# Patient Record
Sex: MALE | Race: WHITE
[De-identification: names, ages, dates, MRNs, and addresses within clinical notes are randomized per-mention and may not be internally consistent; named-entity substitution may affect disease eponyms.]

---

## 2017-02-13 ENCOUNTER — HOSPITAL ENCOUNTER (EMERGENCY)
Dept: HOSPITAL 74 - JER | Age: 42
Discharge: LEFT BEFORE DISCHARGE | End: 2017-02-13
Payer: COMMERCIAL

## 2017-02-13 VITALS — DIASTOLIC BLOOD PRESSURE: 69 MMHG | HEART RATE: 65 BPM | SYSTOLIC BLOOD PRESSURE: 127 MMHG

## 2017-02-13 VITALS — BODY MASS INDEX: 30.7 KG/M2

## 2017-02-13 VITALS — TEMPERATURE: 97 F

## 2017-02-13 DIAGNOSIS — R40.20: ICD-10-CM

## 2017-02-13 DIAGNOSIS — F19.90: ICD-10-CM
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
## 2017-02-13 DIAGNOSIS — T50.994A: Primary | ICD-10-CM

## 2017-02-13 LAB
ALBUMIN SERPL-MCNC: 3.9 G/DL (ref 3.4–5)
ALP SERPL-CCNC: 65 U/L (ref 45–117)
ALT SERPL-CCNC: 31 U/L (ref 12–78)
ANION GAP SERPL CALC-SCNC: 10 MMOL/L (ref 8–16)
AST SERPL-CCNC: 18 U/L (ref 15–37)
BASOPHILS # BLD: 0.6 % (ref 0–2)
BILIRUB SERPL-MCNC: 0.4 MG/DL (ref 0.2–1)
CALCIUM SERPL-MCNC: 9 MG/DL (ref 8.5–10.1)
CO2 SERPL-SCNC: 26 MMOL/L (ref 21–32)
CREAT SERPL-MCNC: 1 MG/DL (ref 0.7–1.3)
DEPRECATED RDW RBC AUTO: 14.8 % (ref 11.9–15.9)
EOSINOPHIL # BLD: 0.8 % (ref 0–4.5)
ETHANOL SERPL-MCNC: < 5 MG/DL (ref 0–5)
GLUCOSE SERPL-MCNC: 148 MG/DL (ref 74–106)
MCH RBC QN AUTO: 29 PG (ref 25.7–33.7)
MCHC RBC AUTO-ENTMCNC: 33.4 G/DL (ref 32–35.9)
MCV RBC: 86.8 FL (ref 80–96)
NEUTROPHILS # BLD: 74.7 % (ref 42.8–82.8)
PLATELET # BLD AUTO: 275 K/MM3 (ref 134–434)
PMV BLD: 8.3 FL (ref 7.5–11.1)
PROT SERPL-MCNC: 6.9 G/DL (ref 6.4–8.2)
WBC # BLD AUTO: 12.8 K/MM3 (ref 4–10)

## 2017-02-13 PROCEDURE — 3E033GC INTRODUCTION OF OTHER THERAPEUTIC SUBSTANCE INTO PERIPHERAL VEIN, PERCUTANEOUS APPROACH: ICD-10-PCS

## 2017-02-13 NOTE — PDOC
*Physical Exam





- Vital Signs


 Last Vital Signs











Temp Pulse Resp BP Pulse Ox


 


 97.0 F L  65   20   127/69   100 


 


 02/13/17 18:05  02/13/17 18:55  02/13/17 18:55  02/13/17 18:55  02/13/17 18:55














- Physical Exam


General Appearance: Yes: Nourished, Appropriately Dressed.  No: Apparent 

Distress


HEENT: positive: EOMI, NORMA, Normal ENT Inspection


Neck: positive: Supple.  negative: Tender


Respiratory/Chest: positive: Lungs Clear, Normal Breath Sounds.  negative: 

Chest Tender, Respiratory Distress


Cardiovascular: positive: Regular Rhythm, Regular Rate, Tachycardia


Integumentary: positive: Normal Color


Neurologic: positive: Fully Oriented, Alert, Normal Mood/Affect, Normal Response

, Motor Strength 5/5





ED Treatment Course





- LABORATORY


CBC & Chemistry Diagram: 


 02/13/17 19:00





 02/13/17 19:00





- ADDITIONAL ORDERS


Additional order review: 


 











  02/13/17





  19:00


 


RBC  5.57


 


MCV  86.8


 


MCHC  33.4


 


RDW  14.8


 


MPV  8.3


 


Neutrophils %  74.7


 


Lymphocytes %  16.6


 


Monocytes %  7.3


 


Eosinophils %  0.8


 


Basophils %  0.6














- Medications


Given in the ED: 


ED Medications














Discontinued Medications














Generic Name Dose Route Start Last Admin





  Trade Name Liseth  PRN Reason Stop Dose Admin


 


Naloxone HCl  2 mg 02/13/17 18:19 02/13/17 18:22





  Narcan -  IVPUSH 02/13/17 18:20  2 mg





  ONCE ONE   Administration














Progress Note





- Progress Note


Progress Note: 


over an hour from narcan. respiratory and hemodynamically stable


will sign out ama since refuses to stay longer for observation





*DC/Admit/Observation/Transfer


Diagnosis at time of Disposition: 


Drug overdose


Qualifiers:


 Encounter type: initial encounter Injury intent: undetermined intent Qualified 

Code(s): T50.904A - Poisoning by unspecified drugs, medicaments and biological 

substances, undetermined, initial encounter





- Discharge Dispostion


Disposition: AGAINST MEDICAL ADVICE





- Referrals


Referrals: 


Kirk Correia MD [Primary Care Provider] - Call tomorrow





- Patient Instructions


Additional Instructions: 


AVOID DRUGS


MEDICATION GIVEN TO OPPOSE HEROIN IS WEARING OFF AND YOU COULD FEEL THE RETURN 

OF HEROIN EFFECTS 


CONSIDER DETOX


RETURN IF WORSENING OR NEW SYMPTOMS





- Post Discharge Activity
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
## 2017-02-14 NOTE — EKG
Test Reason : 

Blood Pressure : ***/*** mmHG

Vent. Rate : 088 BPM     Atrial Rate : 088 BPM

   P-R Int : 160 ms          QRS Dur : 092 ms

    QT Int : 380 ms       P-R-T Axes : 066 074 058 degrees

   QTc Int : 459 ms

 

NORMAL SINUS RHYTHM

BIATRIAL ENLARGEMENT

ABNORMAL ECG

NO PREVIOUS ECGS AVAILABLE

Confirmed by REENA CARTER, GUALBERTO ([id number]) on 2/14/2017 10:28:29 AM

 

Referred By:             Confirmed By:GUALBERTO VALLES MD

## 2018-03-19 ENCOUNTER — HOSPITAL ENCOUNTER (EMERGENCY)
Dept: HOSPITAL 74 - JER | Age: 43
End: 2018-03-19
Payer: COMMERCIAL

## 2018-03-19 VITALS — BODY MASS INDEX: 34.8 KG/M2

## 2018-03-19 VITALS — TEMPERATURE: 97.6 F

## 2018-03-19 DIAGNOSIS — I46.9: Primary | ICD-10-CM

## 2018-03-19 DIAGNOSIS — F11.10: ICD-10-CM

## 2018-03-19 DIAGNOSIS — F17.210: ICD-10-CM

## 2018-03-19 PROCEDURE — 5A02216 ASSISTANCE WITH CARDIAC OUTPUT USING OTHER PUMP, CONTINUOUS: ICD-10-PCS | Performed by: EMERGENCY MEDICINE

## 2018-03-19 PROCEDURE — 0D9670Z DRAINAGE OF STOMACH WITH DRAINAGE DEVICE, VIA NATURAL OR ARTIFICIAL OPENING: ICD-10-PCS | Performed by: EMERGENCY MEDICINE

## 2018-03-19 PROCEDURE — 5A1935Z RESPIRATORY VENTILATION, LESS THAN 24 CONSECUTIVE HOURS: ICD-10-PCS | Performed by: EMERGENCY MEDICINE

## 2018-03-19 PROCEDURE — 0BH17EZ INSERTION OF ENDOTRACHEAL AIRWAY INTO TRACHEA, VIA NATURAL OR ARTIFICIAL OPENING: ICD-10-PCS | Performed by: EMERGENCY MEDICINE

## 2018-03-19 NOTE — PDOC
Attending Attestation





- Resident


Resident Name: Irais Banegas





- ED Attending Attestation


I have performed the following: I have examined & evaluated the patient, The 

case was reviewed & discussed with the resident, I agree w/resident's findings 

& plan





- HPI


HPI: 





03/19/18 02:19


Pt comes with EMS; cardiac arrest, on the [facility] Moe airway #4 in place.


Pt received 3 EPI in the field, 1 Bicarb, 4mcg narcan.


Pt found on [address] face down in vomit, and passerby called EMS.


In the ER we continued CPR.








- Physicial Exam


PE: 





03/19/18 02:21


Agree with resident exam.


Pt never regained a pulse.  We checked lack of cardiac motion with SONO.


Pt was intubated with a 7.5 ETT in the ER, without difficulty.


OGT placed.


Epi x 1 given


Blood glucose of 280s in the ER.


Pt time of death called at 2AM.


Pt was down for a total of 40 minutes.  He never regained a pulse.


Me accepted the case; I spoke to ME  Hebert Montoya


Case # [id number]





- Medical Decision Making





03/19/18 02:18


Noelestiglorena Rodrigues


ME accepted the case #[id number]

## 2018-03-19 NOTE — PDOC
History of Present Illness





- General


Stated Complaint: CARDIAC ARREST


Time Seen by Provider: 18 02:12


History Source: EMS


Exam Limitations: Clinical Condition





- History of Present Illness


Initial Comments: 


This is a 42 YOM with h/o IV heroin use who was BIBA after being found down and 

unconscious on [address] with vomit around him. A passerby called 911. 

EMS found him to be pulseless and started CPR with the Leonid device, placed a 

supragottic airway and bagged him, found him to be asystolic, and gave a total 

of 3 doses of epinephrine, 1 dose of bicarb, 4 mg Narcan, all without change in 

the asystole. They measured his fingerstick BG to be 281. He was coded for a 

total of about 30 minutes PTA to the ED. There was no family or other  

with the patient on scene.





Past History





- Past Medical History


Allergies/Adverse Reactions: 


 Allergies











Allergy/AdvReac Type Severity Reaction Status Date / Time


 


No Known Allergies Allergy   Verified 17 12:53











Home Medications: 


Ambulatory Orders





Metoclopramide HCl [Reglan -] 10 mg PO DAILY #7 tablet 17 








Thyroid Disease: No





- Suicide/Smoking/Psychosocial Hx


Smoking History: Current every day smoker


Have you smoked in the past 12 months: No


Number of Cigarettes Smoked Daily: 20


Hx Alcohol Use: No


Drug/Substance Use Hx: No


Substance Use Type: None





**Review of Systems





- Review of Systems


Able to Perform ROS?: No (patient unconscious)





*Physical Exam





- Vital Signs


 Last Vital Signs











Temp Pulse Resp BP Pulse Ox


 


 97.6 F   0 L     0/0    


 


 18 02:17  18 02:17     18 02:17   














- Physical Exam


General Appearance: Yes: Obese, Other (unconscious, covered in pink vomit, 

pupils nonreactive, CPR in progress, initially with CPR in progress via Leonid 

machine)


HEENT: positive: Other (copious secretions and emesis, left temple with 5x3 cm 

abrasion)


Respiratory/Chest: positive: Lungs Clear, Crackles (diffusely with bagging)


Cardiovascular: positive: Other (no palpable pulses)


Gastrointestinal/Abdominal: positive: Protuberent


Extremity: positive: Other (extremities are cool)


Integumentary: positive: Other (skin mottling, abrasions and skin tears at 

point of Leonid machine contact on anterior chest wall)


Neurologic: positive: Other (unconscious, absent brainstem reflexes)





Procedures





- Intubation


Time of Intubation: 01:55


Intubation Method: orotracheal


Blade used: Mac


Tube Size (Fr): 7.5


Tube position @ lip (cm): 24


Tube position confirmed by: CO2 detector, Breath sounds


Breath Sounds after Intubation: equal


Intubation Complications: no complications





Medical Decision Making





- Medical Decision Making


42 YOM with h/o IVDA (heroin) who p/w cardiopulmonary arrest.


EMS did CPR and bagged via Moe airway, coded for ~30 min PTA to the ED.


Asystole without change with administration of epi, bicarb, naloxone.


On exam he is unconscious, covered in vomit, Leonid device administering CPR.


Skin mottling, extremities cool.


Pupils nonreactive, no cardiac activity on bedside cardiac US.





Dr. Chandler spoke with ME investigator Ravi.


They will take the case, ME case #[id number].





*DC/Admit/Observation/Transfer


Diagnosis at time of Disposition: 


 Cardiac arrest, Heroin use, Respiratory arrest








- Discharge Dispostion


Disposition: 


Admit: No





- Referrals





- Patient Instructions





- Post Discharge Activity